# Patient Record
Sex: MALE | Race: WHITE
[De-identification: names, ages, dates, MRNs, and addresses within clinical notes are randomized per-mention and may not be internally consistent; named-entity substitution may affect disease eponyms.]

---

## 2018-08-15 ENCOUNTER — HOSPITAL ENCOUNTER (EMERGENCY)
Dept: HOSPITAL 45 - C.EDB | Age: 48
LOS: 1 days | Discharge: HOME | End: 2018-08-16
Payer: COMMERCIAL

## 2018-08-15 VITALS
WEIGHT: 162.92 LBS | WEIGHT: 162.92 LBS | BODY MASS INDEX: 26.18 KG/M2 | BODY MASS INDEX: 26.18 KG/M2 | HEIGHT: 65.98 IN | HEIGHT: 65.98 IN

## 2018-08-15 VITALS — TEMPERATURE: 98.24 F

## 2018-08-15 DIAGNOSIS — M10.9: Primary | ICD-10-CM

## 2018-08-15 DIAGNOSIS — I10: ICD-10-CM

## 2018-08-16 VITALS — HEART RATE: 60 BPM | SYSTOLIC BLOOD PRESSURE: 118 MMHG | DIASTOLIC BLOOD PRESSURE: 85 MMHG | OXYGEN SATURATION: 97 %

## 2018-08-16 NOTE — DIAGNOSTIC IMAGING REPORT
RIGHT FIRST TOE 3 VIEWS



HISTORY: Right first toe  pain, poss fx



COMPARISON: None.



FINDINGS: There is no fracture or dislocation. Mild soft tissue swelling at the

first MTP joint. Lucency through the medial sesamoid at the head of the first

metatarsal appears corticated and therefore likely represents a bipartite

sesamoid. No radiopaque foreign bodies.



IMPRESSION:  

Mild soft tissue swelling at the first MTP joint. No acute fractures within the

right first toe.







Electronically signed by:  Magdiel Ruggiero M.D.

8/16/2018 7:04 AM



Dictated Date/Time:  8/16/2018 7:02 AM

## 2018-08-16 NOTE — EMERGENCY ROOM VISIT NOTE
History


Report prepared by Ivonne:  Dustin Franklin


Under the Supervision of:  Dr. Dante Flores M.D.


First contact with patient:  23:35


Chief Complaint:  TOE PAIN, INJURY


Stated Complaint:  SWOLLEN R BIG TOE





History of Present Illness


The patient is a 48 year old male who presents to the Emergency Room with 

complaints of a swollen right big toe and sharp toe pain that began yesterday 

morning. He describes the pain as an 8/10 and the pain is worsened with 

movement. He mentions that he did not endure any trauma or injury and that he 

just woke up with the symptoms. He has been taking ibuprofen for the pain but 

it is not working. He denies using any new shoes but he does have a medical 

history of hypertension and a family history of gout.





   Source of History:  patient


   Onset:  Yesterday


   Position:  toe(s) (Right big toe)


   Symptom Intensity:  8/10


   Quality:  sharp


   Modifying Factors (Worsening):  movement





Review of Systems


See HPI for pertinent positives & negatives. A total of 6 systems reviewed and 

were otherwise negative.





Past Medical & Surgical


Medical Problems:


(1) Hypertension








Family History





Gout





Social History


Smoking Status:  Never Smoker


Marital Status:  


Housing Status:  lives with significant other





Current/Historical Medications


Scheduled


Prednisone (Prednisone), 0 PO DAILY





Allergies


Coded Allergies:  


     No Known Drug Allergy (Verified  Allergy, Unknown, none, 8/16/18)





Physical Exam


Vital Signs











  Date Time  Temp Pulse Resp B/P (MAP) Pulse Ox O2 Delivery O2 Flow Rate FiO2


 


8/16/18 00:39  60 18 118/85 97   


 


8/15/18 23:22 36.8 62 16 143/105 96 Room Air  











Physical Exam


GENERAL: Sitting up on stretcher, no distress


NEURO: Awake and alert, oriented x3, no focal motor deficits


EXTREMITIES: Right first MTP joint is warm to the touch, no erythema, pain to 

move the joint noted, there is minor swelling. No gross deformity. NVI distally.





Medical Decision & Procedures


ER Provider


Diagnostic Interpretation:


Toe X-Ray of digit 1





There is no bone dislocation, has lucency through sesamoid bone on the first 

toe at the MTP joint, could be chronic or possibly acute. 





I read this X-ray





Medications Administered











 Medications


  (Trade)  Dose


 Ordered  Sig/Jimmie


 Route  Start Time


 Stop Time Status Last Admin


Dose Admin


 


 Prednisone


  (PredniSONE TAB)  60 mg  NOW  STAT


 PO  8/16/18 00:03


 8/16/18 00:04 DC 8/16/18 00:29


60 MG


 


 Oxycodone HCl


  (Roxicodone


 Immediate Rel 5MG


 Home Pack)  1 homepack  UD  ONCE


 PO  8/16/18 00:15


 8/16/18 00:16 DC 8/16/18 00:29


1 HOMEPACK











ED Course


2335: The patient was evaluated in room B5. A complete history and physical 

exam was performed.





0009: I reevaluated the patient and went over the treatment plan. 





0015: Reevaluated the patient. Discussed results and discharge instructions: He 

verbalized understanding and agreement. The patient is ready for discharge.





Medical Decision


Differential Diagnosis


Toe fracture, contusion, dislocation, sprain, cellulitis, gout





Patient presents with right first toe pain.  The pain seems to be at the MTP 

joint.  There is some warmth, no erythema.  No gross deformity.  There was no 

evidence for neurovascular compromise.  The patient has not suffered trauma.  

Films of the toe show a lucency through one of the sesamoid bones, no 

dislocation of the joints.  The lucency could be chronic or possibly acute 

although with no trauma, chronic seems more likely.





The patient is going to be treated for gout.  He was given prednisone, he will 

be on Advil, I gave him a postop shoe.  He will try to keep the foot elevated.  

If things are worsening, he can return for reassessment.  We will call him with 

any concerns noted on the formal radiology read.





Medication Reconcilliation


Current Medication List:  was personally reviewed by me





Blood Pressure Screening


Patient's blood pressure:  Normal blood pressure





Impression





 Primary Impression:  


 Gout





Scribe Attestation


The scribe's documentation has been prepared under my direction and personally 

reviewed by me in its entirety. I confirm that the note above accurately 

reflects all work, treatment, procedures, and medical decision making performed 

by me.





Departure Information


Dispostion


Home / Self-Care





Prescriptions





Prednisone (Prednisone) 20 Mg Tab


0 PO DAILY, #18 TAB


   3 DAILY FOR 3 DAYS, THEN 2 DAILY FOR 3 DAYS, THEN 1 DAILY FOR 3 DAYS.


   Prov: Dante Flores M.D.         8/16/18





Referrals


Radha Jiménez MD (PCP)





Forms


HOME CARE DOCUMENTATION FORM,                                                 

               IMPORTANT VISIT INFORMATION, WORK / SCHOOL INSTRUCTIONS





Patient Instructions


My [facility]